# Patient Record
Sex: FEMALE | Race: ASIAN | NOT HISPANIC OR LATINO
[De-identification: names, ages, dates, MRNs, and addresses within clinical notes are randomized per-mention and may not be internally consistent; named-entity substitution may affect disease eponyms.]

---

## 2023-11-30 PROBLEM — Z00.00 ENCOUNTER FOR PREVENTIVE HEALTH EXAMINATION: Status: ACTIVE | Noted: 2023-11-30

## 2023-12-07 ENCOUNTER — APPOINTMENT (OUTPATIENT)
Dept: INFUSION THERAPY | Facility: CLINIC | Age: 33
End: 2023-12-07

## 2023-12-07 ENCOUNTER — APPOINTMENT (OUTPATIENT)
Dept: INFUSION THERAPY | Facility: CLINIC | Age: 33
End: 2023-12-07
Payer: COMMERCIAL

## 2023-12-07 ENCOUNTER — APPOINTMENT (OUTPATIENT)
Dept: HEMATOLOGY ONCOLOGY | Facility: CLINIC | Age: 33
End: 2023-12-07
Payer: COMMERCIAL

## 2023-12-07 ENCOUNTER — LABORATORY RESULT (OUTPATIENT)
Age: 33
End: 2023-12-07

## 2023-12-07 ENCOUNTER — OUTPATIENT (OUTPATIENT)
Dept: OUTPATIENT SERVICES | Facility: HOSPITAL | Age: 33
LOS: 1 days | Discharge: HOME | End: 2023-12-07
Payer: COMMERCIAL

## 2023-12-07 VITALS
SYSTOLIC BLOOD PRESSURE: 132 MMHG | BODY MASS INDEX: 34.02 KG/M2 | WEIGHT: 192 LBS | DIASTOLIC BLOOD PRESSURE: 86 MMHG | HEART RATE: 93 BPM | HEIGHT: 63 IN

## 2023-12-07 VITALS — TEMPERATURE: 97.2 F

## 2023-12-07 DIAGNOSIS — D56.3 THALASSEMIA MINOR: ICD-10-CM

## 2023-12-07 DIAGNOSIS — D64.9 ANEMIA, UNSPECIFIED: ICD-10-CM

## 2023-12-07 DIAGNOSIS — Z87.891 PERSONAL HISTORY OF NICOTINE DEPENDENCE: ICD-10-CM

## 2023-12-07 DIAGNOSIS — O99.012 ANEMIA COMPLICATING PREGNANCY, SECOND TRIMESTER: ICD-10-CM

## 2023-12-07 LAB
HCT VFR BLD CALC: 30.3 %
HGB BLD-MCNC: 9.8 G/DL
MCHC RBC-ENTMCNC: 18.8 PG
MCHC RBC-ENTMCNC: 32.3 G/DL
MCV RBC AUTO: 58 FL
PLATELET # BLD AUTO: 327 K/UL
PMV BLD: 9.3 FL
RBC # BLD: 5.22 M/UL
RBC # FLD: 17 %
WBC # FLD AUTO: 11.06 K/UL

## 2023-12-07 PROCEDURE — 36415 COLL VENOUS BLD VENIPUNCTURE: CPT

## 2023-12-07 PROCEDURE — 99204 OFFICE O/P NEW MOD 45 MIN: CPT

## 2023-12-07 PROCEDURE — 85027 COMPLETE CBC AUTOMATED: CPT

## 2023-12-07 PROCEDURE — 82728 ASSAY OF FERRITIN: CPT

## 2023-12-08 DIAGNOSIS — D64.9 ANEMIA, UNSPECIFIED: ICD-10-CM

## 2023-12-13 ENCOUNTER — APPOINTMENT (OUTPATIENT)
Dept: INFUSION THERAPY | Facility: CLINIC | Age: 33
End: 2023-12-13

## 2023-12-14 PROBLEM — D56.3 BETA THALASSEMIA TRAIT: Status: RESOLVED | Noted: 2023-12-14 | Resolved: 2023-12-14

## 2023-12-14 PROBLEM — Z87.891 FORMER SMOKER: Status: ACTIVE | Noted: 2023-12-07

## 2023-12-14 PROBLEM — O99.012 ANEMIA DURING PREGNANCY IN SECOND TRIMESTER: Status: ACTIVE | Noted: 2023-12-14

## 2023-12-14 LAB — FERRITIN SERPL-MCNC: 47 NG/ML

## 2023-12-14 NOTE — ASSESSMENT
[FreeTextEntry1] : Pt is a  33 year old at 27 weeks gestation presenting to the office with possible anemia by Dr. Banks. 2023 lab work shows Hgb 9, HCT 29.6%, MCV 61.3, RDW 18,  K, WBC 12.5. Pt states she has hx of anemia and recently dx with beta thalassemia trait during this pregnancy. She was taking PO iron prior and began again after 2023 labwork. Pt states she does feel fatigued and some days worse than others today she feels okay. Pt denies fever, chills, , chest pain, N/V/D, constipation, joint pain, any bruising or bleeding.   Impression:  Anemia in second trimester pregnancy                     h/o beta thalassemia trait  Plan: Records/pertinent labs reviewed. Blood work ordered:  CBC, iron studies, ferritin, Will call pt with results- discussed possibly needing IV iron. Follow up [id number], PCP.

## 2023-12-14 NOTE — HISTORY OF PRESENT ILLNESS
[de-identified] : Pt is a  33 year old at 27 weeks gestation presenting to the office with possible anemia by Dr. Banks. 2023 lab work shows Hgb 9, HCT 29.6%, MCV 61.3, RDW 18,  K, WBC 12.5. Pt states she has hx of anemia and recently dx with beta thalassemia trait during this pregnancy. She was taking PO iron prior and began again after 2023 labwork. Pt states she does feel fatigued and some days worse than others today she feels okay. Pt denies fever, chills, , chest pain, N/V/D, constipation, joint pain, any bruising or bleeding.

## 2023-12-29 ENCOUNTER — APPOINTMENT (OUTPATIENT)
Dept: INFUSION THERAPY | Facility: CLINIC | Age: 33
End: 2023-12-29

## 2023-12-29 ENCOUNTER — OUTPATIENT (OUTPATIENT)
Dept: OUTPATIENT SERVICES | Facility: HOSPITAL | Age: 33
LOS: 1 days | End: 2023-12-29
Payer: COMMERCIAL

## 2023-12-29 DIAGNOSIS — D64.9 ANEMIA, UNSPECIFIED: ICD-10-CM

## 2023-12-29 PROCEDURE — 96365 THER/PROPH/DIAG IV INF INIT: CPT

## 2023-12-29 RX ORDER — IRON SUCROSE 20 MG/ML
200 INJECTION, SOLUTION INTRAVENOUS ONCE
Refills: 0 | Status: COMPLETED | OUTPATIENT
Start: 2023-12-29 | End: 2023-12-29

## 2023-12-29 RX ADMIN — IRON SUCROSE 520 MILLIGRAM(S): 20 INJECTION, SOLUTION INTRAVENOUS at 15:39

## 2023-12-29 RX ADMIN — IRON SUCROSE 200 MILLIGRAM(S): 20 INJECTION, SOLUTION INTRAVENOUS at 16:11

## 2024-01-10 ENCOUNTER — APPOINTMENT (OUTPATIENT)
Dept: INFUSION THERAPY | Facility: CLINIC | Age: 34
End: 2024-01-10

## 2024-01-19 ENCOUNTER — APPOINTMENT (OUTPATIENT)
Dept: INFUSION THERAPY | Facility: CLINIC | Age: 34
End: 2024-01-19

## 2024-01-23 ENCOUNTER — LABORATORY RESULT (OUTPATIENT)
Age: 34
End: 2024-01-23

## 2024-01-23 ENCOUNTER — APPOINTMENT (OUTPATIENT)
Dept: HEMATOLOGY ONCOLOGY | Facility: CLINIC | Age: 34
End: 2024-01-23
Payer: COMMERCIAL

## 2024-01-23 ENCOUNTER — APPOINTMENT (OUTPATIENT)
Dept: INFUSION THERAPY | Facility: CLINIC | Age: 34
End: 2024-01-23
Payer: COMMERCIAL

## 2024-01-23 ENCOUNTER — OUTPATIENT (OUTPATIENT)
Dept: OUTPATIENT SERVICES | Facility: HOSPITAL | Age: 34
LOS: 1 days | End: 2024-01-23
Payer: COMMERCIAL

## 2024-01-23 VITALS
DIASTOLIC BLOOD PRESSURE: 79 MMHG | WEIGHT: 200 LBS | HEIGHT: 63 IN | SYSTOLIC BLOOD PRESSURE: 127 MMHG | TEMPERATURE: 97.9 F | HEART RATE: 95 BPM | BODY MASS INDEX: 35.44 KG/M2

## 2024-01-23 DIAGNOSIS — O99.013 ANEMIA COMPLICATING PREGNANCY, THIRD TRIMESTER: ICD-10-CM

## 2024-01-23 DIAGNOSIS — D64.9 ANEMIA, UNSPECIFIED: ICD-10-CM

## 2024-01-23 LAB
HCT VFR BLD CALC: 30.6 %
HGB BLD-MCNC: 10 G/DL
MCHC RBC-ENTMCNC: 19.2 PG
MCHC RBC-ENTMCNC: 32.7 G/DL
MCV RBC AUTO: 58.6 FL
PLATELET # BLD AUTO: 312 K/UL
PMV BLD: NORMAL
RBC # BLD: 5.22 M/UL
RBC # FLD: 17.2 %
WBC # FLD AUTO: 11.04 K/UL

## 2024-01-23 PROCEDURE — 83540 ASSAY OF IRON: CPT

## 2024-01-23 PROCEDURE — 99213 OFFICE O/P EST LOW 20 MIN: CPT | Mod: 25

## 2024-01-23 PROCEDURE — 85027 COMPLETE CBC AUTOMATED: CPT

## 2024-01-23 PROCEDURE — 36415 COLL VENOUS BLD VENIPUNCTURE: CPT

## 2024-01-23 PROCEDURE — 96365 THER/PROPH/DIAG IV INF INIT: CPT

## 2024-01-23 PROCEDURE — 83550 IRON BINDING TEST: CPT

## 2024-01-23 PROCEDURE — 82728 ASSAY OF FERRITIN: CPT

## 2024-01-23 PROCEDURE — 99213 OFFICE O/P EST LOW 20 MIN: CPT

## 2024-01-23 RX ORDER — IRON SUCROSE 20 MG/ML
200 INJECTION, SOLUTION INTRAVENOUS ONCE
Refills: 0 | Status: COMPLETED | OUTPATIENT
Start: 2024-01-23 | End: 2024-01-23

## 2024-01-23 RX ADMIN — IRON SUCROSE 200 MILLIGRAM(S): 20 INJECTION, SOLUTION INTRAVENOUS at 13:05

## 2024-01-23 RX ADMIN — IRON SUCROSE 220 MILLIGRAM(S): 20 INJECTION, SOLUTION INTRAVENOUS at 12:33

## 2024-01-24 DIAGNOSIS — D64.9 ANEMIA, UNSPECIFIED: ICD-10-CM

## 2024-01-24 LAB
FERRITIN SERPL-MCNC: 77 NG/ML
IRON SATN MFR SERPL: 17 %
IRON SERPL-MCNC: 78 UG/DL
TIBC SERPL-MCNC: 452 UG/DL
UIBC SERPL-MCNC: 374 UG/DL

## 2024-01-28 PROBLEM — O99.013 ANTEPARTUM ANEMIA IN THIRD TRIMESTER: Status: ACTIVE | Noted: 2024-01-28

## 2024-01-28 NOTE — ASSESSMENT
[FreeTextEntry1] : Pt is a  33 year old at 27 weeks gestation presenting to the office with possible anemia by Dr. Banks. 2023 lab work shows Hgb 9, HCT 29.6%, MCV 61.3, RDW 18,  K, WBC 12.5. Pt states she has hx of anemia and recently dx with beta thalassemia trait during this pregnancy. She was taking PO iron prior and began again after 2023 labwork. Pt states she does feel fatigued and some days worse than others today she feels okay. Pt denies fever, chills, , chest pain, N/V/D, constipation, joint pain, any bruising or bleeding.   Impression:  Anemia in third trimester pregnancy                     h/o beta thalassemia trait  Plan: Records/pertinent labs reviewed. Blood work ordered:  CBC, iron studies, ferritin,  Venofer #2 to be given today. Will call pt with results- discussed possibly needing IV iron. Follow up OBGYN, PCP.

## 2024-01-28 NOTE — REASON FOR VISIT
[Initial Consultation] : an initial consultation for [Follow-Up Visit] : a follow-up visit for [FreeTextEntry2] : Gestational anemia, FRANTZ

## 2024-01-28 NOTE — HISTORY OF PRESENT ILLNESS
[de-identified] : Pt is a  33 year old at 27 weeks gestation presenting to the office with possible anemia by Dr. Banks. 2023 lab work shows Hgb 9, HCT 29.6%, MCV 61.3, RDW 18,  K, WBC 12.5. Pt states she has hx of anemia and recently dx with beta thalassemia trait during this pregnancy. She was taking PO iron prior and began again after 2023 labwork. Pt states she does feel fatigued and some days worse than others today she feels okay. Pt denies fever, chills, , chest pain, N/V/D, constipation, joint pain, any bruising or bleeding.   [de-identified] :  2024 Pt is a  33 year old at 34 weeks gestation here for f/u anemia.  She only received one infusion of Venofer so far due to bad weather conditions. She felt that she improved after the first infusion. She has hx of anemia and recently dx with beta thalassemia trait during this pregnancy. She is taking PO iron daily.  Pt denies fever, chills, , chest pain, N/V/D, constipation, joint pain, any bruising or bleeding.

## 2024-01-28 NOTE — PHYSICAL EXAM
[Fully active, able to carry on all pre-disease performance without restriction] : Status 0 - Fully active, able to carry on all pre-disease performance without restriction [Normal] : affect appropriate [de-identified] : soft, distended abdomen at 34 weeks pregnant, no tenderness

## 2024-02-14 ENCOUNTER — APPOINTMENT (OUTPATIENT)
Dept: INFUSION THERAPY | Facility: CLINIC | Age: 34
End: 2024-02-14

## 2024-02-14 ENCOUNTER — OUTPATIENT (OUTPATIENT)
Dept: OUTPATIENT SERVICES | Facility: HOSPITAL | Age: 34
LOS: 1 days | End: 2024-02-14
Payer: COMMERCIAL

## 2024-02-14 VITALS — HEART RATE: 89 BPM | SYSTOLIC BLOOD PRESSURE: 116 MMHG | TEMPERATURE: 97 F | DIASTOLIC BLOOD PRESSURE: 76 MMHG

## 2024-02-14 DIAGNOSIS — D64.9 ANEMIA, UNSPECIFIED: ICD-10-CM

## 2024-02-14 PROCEDURE — 96365 THER/PROPH/DIAG IV INF INIT: CPT

## 2024-02-14 RX ORDER — IRON SUCROSE 20 MG/ML
200 INJECTION, SOLUTION INTRAVENOUS ONCE
Refills: 0 | Status: COMPLETED | OUTPATIENT
Start: 2024-02-14 | End: 2024-02-14

## 2024-02-14 RX ADMIN — IRON SUCROSE 220 MILLIGRAM(S): 20 INJECTION, SOLUTION INTRAVENOUS at 17:46

## 2024-02-15 DIAGNOSIS — D64.9 ANEMIA, UNSPECIFIED: ICD-10-CM

## 2024-02-28 ENCOUNTER — APPOINTMENT (OUTPATIENT)
Dept: INFUSION THERAPY | Facility: CLINIC | Age: 34
End: 2024-02-28

## 2024-02-28 ENCOUNTER — OUTPATIENT (OUTPATIENT)
Dept: OUTPATIENT SERVICES | Facility: HOSPITAL | Age: 34
LOS: 1 days | End: 2024-02-28
Payer: COMMERCIAL

## 2024-02-28 DIAGNOSIS — D64.9 ANEMIA, UNSPECIFIED: ICD-10-CM

## 2024-02-28 DIAGNOSIS — O99.012 ANEMIA COMPLICATING PREGNANCY, SECOND TRIMESTER: ICD-10-CM

## 2024-02-28 PROCEDURE — 96365 THER/PROPH/DIAG IV INF INIT: CPT

## 2024-02-28 RX ORDER — IRON SUCROSE 20 MG/ML
200 INJECTION, SOLUTION INTRAVENOUS ONCE
Refills: 0 | Status: COMPLETED | OUTPATIENT
Start: 2024-02-28 | End: 2024-02-28

## 2024-02-28 RX ADMIN — IRON SUCROSE 220 MILLIGRAM(S): 20 INJECTION, SOLUTION INTRAVENOUS at 16:08

## 2024-02-29 DIAGNOSIS — O99.012 ANEMIA COMPLICATING PREGNANCY, SECOND TRIMESTER: ICD-10-CM

## 2024-03-03 ENCOUNTER — INPATIENT (INPATIENT)
Facility: HOSPITAL | Age: 34
LOS: 3 days | Discharge: HOME | End: 2024-03-07
Attending: STUDENT IN AN ORGANIZED HEALTH CARE EDUCATION/TRAINING PROGRAM | Admitting: STUDENT IN AN ORGANIZED HEALTH CARE EDUCATION/TRAINING PROGRAM
Payer: COMMERCIAL

## 2024-03-03 VITALS — HEART RATE: 88 BPM | SYSTOLIC BLOOD PRESSURE: 127 MMHG | DIASTOLIC BLOOD PRESSURE: 73 MMHG

## 2024-03-03 DIAGNOSIS — O42.92 FULL-TERM PREMATURE RUPTURE OF MEMBRANES, UNSPECIFIED AS TO LENGTH OF TIME BETWEEN RUPTURE AND ONSET OF LABOR: ICD-10-CM

## 2024-03-03 LAB
ABO RH CONFIRMATION: SIGNIFICANT CHANGE UP
APPEARANCE UR: ABNORMAL
BACTERIA # UR AUTO: ABNORMAL /HPF
BASOPHILS # BLD AUTO: 0.04 K/UL — SIGNIFICANT CHANGE UP (ref 0–0.2)
BASOPHILS NFR BLD AUTO: 0.4 % — SIGNIFICANT CHANGE UP (ref 0–1)
BILIRUB UR-MCNC: NEGATIVE — SIGNIFICANT CHANGE UP
BLD GP AB SCN SERPL QL: SIGNIFICANT CHANGE UP
CAST: 6 /LPF — HIGH (ref 0–4)
COLOR SPEC: YELLOW — SIGNIFICANT CHANGE UP
DIFF PNL FLD: NEGATIVE — SIGNIFICANT CHANGE UP
EOSINOPHIL # BLD AUTO: 0.03 K/UL — SIGNIFICANT CHANGE UP (ref 0–0.7)
EOSINOPHIL NFR BLD AUTO: 0.3 % — SIGNIFICANT CHANGE UP (ref 0–8)
GLUCOSE UR QL: NEGATIVE MG/DL — SIGNIFICANT CHANGE UP
HCT VFR BLD CALC: 32.2 % — LOW (ref 37–47)
HGB BLD-MCNC: 10.2 G/DL — LOW (ref 12–16)
HIV 1 & 2 AB SERPL IA.RAPID: SIGNIFICANT CHANGE UP
IMM GRANULOCYTES NFR BLD AUTO: 0.6 % — HIGH (ref 0.1–0.3)
KETONES UR-MCNC: NEGATIVE MG/DL — SIGNIFICANT CHANGE UP
LEUKOCYTE ESTERASE UR-ACNC: ABNORMAL
LYMPHOCYTES # BLD AUTO: 2.3 K/UL — SIGNIFICANT CHANGE UP (ref 1.2–3.4)
LYMPHOCYTES # BLD AUTO: 20.6 % — SIGNIFICANT CHANGE UP (ref 20.5–51.1)
MCHC RBC-ENTMCNC: 18.9 PG — LOW (ref 27–31)
MCHC RBC-ENTMCNC: 31.7 G/DL — LOW (ref 32–37)
MCV RBC AUTO: 59.6 FL — LOW (ref 81–99)
MONOCYTES # BLD AUTO: 0.72 K/UL — HIGH (ref 0.1–0.6)
MONOCYTES NFR BLD AUTO: 6.5 % — SIGNIFICANT CHANGE UP (ref 1.7–9.3)
NEUTROPHILS # BLD AUTO: 7.99 K/UL — HIGH (ref 1.4–6.5)
NEUTROPHILS NFR BLD AUTO: 71.6 % — SIGNIFICANT CHANGE UP (ref 42.2–75.2)
NITRITE UR-MCNC: NEGATIVE — SIGNIFICANT CHANGE UP
NRBC # BLD: 0 /100 WBCS — SIGNIFICANT CHANGE UP (ref 0–0)
PH UR: 6.5 — SIGNIFICANT CHANGE UP (ref 5–8)
PLATELET # BLD AUTO: 247 K/UL — SIGNIFICANT CHANGE UP (ref 130–400)
PMV BLD: SIGNIFICANT CHANGE UP (ref 7.4–10.4)
PRENATAL SYPHILIS TEST: SIGNIFICANT CHANGE UP
PROT UR-MCNC: SIGNIFICANT CHANGE UP MG/DL
RBC # BLD: 5.4 M/UL — SIGNIFICANT CHANGE UP (ref 4.2–5.4)
RBC # FLD: 17.4 % — HIGH (ref 11.5–14.5)
RBC CASTS # UR COMP ASSIST: 2 /HPF — SIGNIFICANT CHANGE UP (ref 0–4)
SP GR SPEC: 1.02 — SIGNIFICANT CHANGE UP (ref 1–1.03)
SQUAMOUS # UR AUTO: >36 /HPF — HIGH (ref 0–5)
UROBILINOGEN FLD QL: 1 MG/DL — SIGNIFICANT CHANGE UP (ref 0.2–1)
WBC # BLD: 11.15 K/UL — HIGH (ref 4.8–10.8)
WBC # FLD AUTO: 11.15 K/UL — HIGH (ref 4.8–10.8)
WBC UR QL: 4 /HPF — SIGNIFICANT CHANGE UP (ref 0–5)

## 2024-03-03 PROCEDURE — 86703 HIV-1/HIV-2 1 RESULT ANTBDY: CPT

## 2024-03-03 PROCEDURE — 80307 DRUG TEST PRSMV CHEM ANLYZR: CPT

## 2024-03-03 PROCEDURE — 86850 RBC ANTIBODY SCREEN: CPT

## 2024-03-03 PROCEDURE — 36415 COLL VENOUS BLD VENIPUNCTURE: CPT

## 2024-03-03 PROCEDURE — 59050 FETAL MONITOR W/REPORT: CPT

## 2024-03-03 PROCEDURE — 85730 THROMBOPLASTIN TIME PARTIAL: CPT

## 2024-03-03 PROCEDURE — 36430 TRANSFUSION BLD/BLD COMPNT: CPT

## 2024-03-03 PROCEDURE — 86923 COMPATIBILITY TEST ELECTRIC: CPT

## 2024-03-03 PROCEDURE — 86592 SYPHILIS TEST NON-TREP QUAL: CPT

## 2024-03-03 PROCEDURE — 80354 DRUG SCREENING FENTANYL: CPT

## 2024-03-03 PROCEDURE — P9016: CPT

## 2024-03-03 PROCEDURE — 86900 BLOOD TYPING SEROLOGIC ABO: CPT

## 2024-03-03 PROCEDURE — 85384 FIBRINOGEN ACTIVITY: CPT

## 2024-03-03 PROCEDURE — 81001 URINALYSIS AUTO W/SCOPE: CPT

## 2024-03-03 PROCEDURE — 85610 PROTHROMBIN TIME: CPT

## 2024-03-03 PROCEDURE — 86901 BLOOD TYPING SEROLOGIC RH(D): CPT

## 2024-03-03 PROCEDURE — 85025 COMPLETE CBC W/AUTO DIFF WBC: CPT

## 2024-03-03 RX ORDER — OXYTOCIN 10 UNIT/ML
333.33 VIAL (ML) INJECTION
Qty: 20 | Refills: 0 | Status: DISCONTINUED | OUTPATIENT
Start: 2024-03-03 | End: 2024-03-07

## 2024-03-03 RX ORDER — DINOPROSTONE 10 MG/241MG
10 INSERT VAGINAL ONCE
Refills: 0 | Status: COMPLETED | OUTPATIENT
Start: 2024-03-03 | End: 2024-03-03

## 2024-03-03 RX ORDER — SODIUM CHLORIDE 9 MG/ML
1000 INJECTION, SOLUTION INTRAVENOUS
Refills: 0 | Status: DISCONTINUED | OUTPATIENT
Start: 2024-03-03 | End: 2024-03-04

## 2024-03-03 RX ADMIN — DINOPROSTONE 10 MILLIGRAM(S): 10 INSERT VAGINAL at 20:20

## 2024-03-03 NOTE — OB PROVIDER H&P - HISTORY OF PRESENT ILLNESS
34yo  at 40w0d GA (RADHAMES: 3/3/24, by LMP) presents to labor and delivery for scheduled elective IOL. Endorses irregular contractions. Denies vaginal bleeding, leakage of fluids. Endorses good fetal movement. GBS neg.    Pregnancy complicated by:  - h/o beta thal trait, FOB neg  - fetal renal pelvis dilation  - elevated GCT, normal GTT 34yo  at 40w0d GA (RADAHMES: 3/3/24, by LMP) presents to labor and delivery for scheduled IOL. Endorses irregular contractions. Denies vaginal bleeding, leakage of fluids. Endorses good fetal movement. GBS neg.    Pregnancy complicated by:  - h/o beta thal trait, FOB neg  - fetal renal pelvis dilation  - elevated GCT, normal GTT

## 2024-03-03 NOTE — OB PROVIDER H&P - NSHPLABSRESULTS_GEN_ALL_CORE
2/8/24  GBS neg    12/1/23  GTT 80/141/151/118    11/22/23      7/24/23  A pos, neg antibody screen  RPR NR  HepBSAg NR  HIV NR  varicella immune  rubella immune

## 2024-03-03 NOTE — OB PROVIDER H&P - NSLOWPPHRISK_OBGYN_A_OB
No previous uterine incision/Benavides Pregnancy/Less than or equal to 4 previous vaginal births/No known bleeding disorder/No history of postpartum hemorrhage/No other PPH risks indicated

## 2024-03-03 NOTE — OB PROVIDER H&P - ASSESSMENT
A/P: 34yo  at 40w0d GA, GBS neg, elective IOL at term    -admit to labor and delivery  -pain management prn   -continous efm & toco  -admission labs  -IV access   -IV hydration   -diet: clear liquid diet     Case discussed with Dr. Tapia and Dr. Banks

## 2024-03-03 NOTE — OB PROVIDER H&P - ATTENDING COMMENTS
Alicia Garsia is a  at 40w0d by LMP c/w 8wk US admitted for IOL for fetus with R hydronephrosis (as recommended by [facility] Dr. Valdovinos)    - Fetus with R hydronephrosis with calyceal and ureter dilation (R renal pelvis 30.3mm), ?multicystic appearance on scan . Peds notified.   - Admit SVE C/L/H, cervidil for cervical ripening.  - Beta thalassemia trait (FOB negative), iron deficiency anemia s/p iron infusions. Admission labs reviewed H/H 10.2/32.2, plts 247K  - FHR category 1, reactive and reassuring  - Last Growth US on 24 at 39w1d, EFW 3446g (55%), AC 34%, VTX, posterior placenta, PIYUSH 21.1cm, BPP 8/8, normal UAD  - GBS neg      Reviewed with patient R/B/A/I of IOL as well as process and expectations. All questions answered to her satisfaction.      Jo Banks MD

## 2024-03-03 NOTE — OB RN PATIENT PROFILE - WEIGHT IN KG
Azathioprine Counseling:  I discussed with the patient the risks of azathioprine including but not limited to myelosuppression, immunosuppression, hepatotoxicity, lymphoma, and infections.  The patient understands that monitoring is required including baseline LFTs, Creatinine, possible TPMP genotyping and weekly CBCs for the first month and then every 2 weeks thereafter.  The patient verbalized understanding of the proper use and possible adverse effects of azathioprine.  All of the patient's questions and concerns were addressed. 94.8

## 2024-03-03 NOTE — OB PROVIDER H&P - NSHPPHYSICALEXAM_GEN_ALL_CORE
Vital Signs Last 24 Hrs  HR: 88 (03 Mar 2024 17:26) (88 - 88)  BP: 127/73 (03 Mar 2024 17:26) (127/73 - 127/73)  RR: 20 (03 Mar 2024 17:26) (20 - 20)    Physical Exam  Gen: AAOx3, NAD  Abd: soft, gravid, nontender, no palpable contractions  Ext: no edema or erythema in bilateral upper and lower extremities  SVE: 0/0/-3    EFM: 140/mod/+accels  [place]: irreg  BSUS: vertex

## 2024-03-04 ENCOUNTER — APPOINTMENT (OUTPATIENT)
Dept: INFUSION THERAPY | Facility: CLINIC | Age: 34
End: 2024-03-04

## 2024-03-04 LAB
AMPHET UR-MCNC: NEGATIVE — SIGNIFICANT CHANGE UP
APTT BLD: 27.2 SEC — SIGNIFICANT CHANGE UP (ref 27–39.2)
BARBITURATES UR SCN-MCNC: NEGATIVE — SIGNIFICANT CHANGE UP
BASOPHILS # BLD AUTO: 0.03 K/UL — SIGNIFICANT CHANGE UP (ref 0–0.2)
BASOPHILS NFR BLD AUTO: 0.1 % — SIGNIFICANT CHANGE UP (ref 0–1)
BENZODIAZ UR-MCNC: NEGATIVE — SIGNIFICANT CHANGE UP
BUPRENORPHINE SCREEN, URINE RESULT: NEGATIVE — SIGNIFICANT CHANGE UP
COCAINE METAB.OTHER UR-MCNC: NEGATIVE — SIGNIFICANT CHANGE UP
EOSINOPHIL # BLD AUTO: 0 K/UL — SIGNIFICANT CHANGE UP (ref 0–0.7)
EOSINOPHIL NFR BLD AUTO: 0 % — SIGNIFICANT CHANGE UP (ref 0–8)
FENTANYL UR QL: NEGATIVE — SIGNIFICANT CHANGE UP
FIBRINOGEN PPP-MCNC: 550 MG/DL — HIGH (ref 200–435)
HCT VFR BLD CALC: 35.1 % — LOW (ref 37–47)
HGB BLD-MCNC: 10.9 G/DL — LOW (ref 12–16)
IMM GRANULOCYTES NFR BLD AUTO: 0.6 % — HIGH (ref 0.1–0.3)
INR BLD: 0.91 RATIO — SIGNIFICANT CHANGE UP (ref 0.65–1.3)
L&D DRUG SCREEN, URINE: SIGNIFICANT CHANGE UP
LYMPHOCYTES # BLD AUTO: 0.83 K/UL — LOW (ref 1.2–3.4)
LYMPHOCYTES # BLD AUTO: 3.8 % — LOW (ref 20.5–51.1)
MCHC RBC-ENTMCNC: 18.8 PG — LOW (ref 27–31)
MCHC RBC-ENTMCNC: 31.1 G/DL — LOW (ref 32–37)
MCV RBC AUTO: 60.5 FL — LOW (ref 81–99)
METHADONE UR-MCNC: NEGATIVE — SIGNIFICANT CHANGE UP
MONOCYTES # BLD AUTO: 0.94 K/UL — HIGH (ref 0.1–0.6)
MONOCYTES NFR BLD AUTO: 4.3 % — SIGNIFICANT CHANGE UP (ref 1.7–9.3)
NEUTROPHILS # BLD AUTO: 19.82 K/UL — HIGH (ref 1.4–6.5)
NEUTROPHILS NFR BLD AUTO: 91.2 % — HIGH (ref 42.2–75.2)
NRBC # BLD: 0 /100 WBCS — SIGNIFICANT CHANGE UP (ref 0–0)
OPIATES UR-MCNC: NEGATIVE — SIGNIFICANT CHANGE UP
OXYCODONE UR-MCNC: NEGATIVE — SIGNIFICANT CHANGE UP
PCP UR-MCNC: NEGATIVE — SIGNIFICANT CHANGE UP
PLATELET # BLD AUTO: 237 K/UL — SIGNIFICANT CHANGE UP (ref 130–400)
PMV BLD: SIGNIFICANT CHANGE UP (ref 7.4–10.4)
PROPOXYPHENE QUALITATIVE URINE RESULT: NEGATIVE — SIGNIFICANT CHANGE UP
PROTHROM AB SERPL-ACNC: 10.4 SEC — SIGNIFICANT CHANGE UP (ref 9.95–12.87)
RBC # BLD: 5.8 M/UL — HIGH (ref 4.2–5.4)
RBC # FLD: 17.8 % — HIGH (ref 11.5–14.5)
WBC # BLD: 21.75 K/UL — HIGH (ref 4.8–10.8)
WBC # FLD AUTO: 21.75 K/UL — HIGH (ref 4.8–10.8)

## 2024-03-04 RX ORDER — SIMETHICONE 80 MG/1
80 TABLET, CHEWABLE ORAL EVERY 4 HOURS
Refills: 0 | Status: DISCONTINUED | OUTPATIENT
Start: 2024-03-04 | End: 2024-03-07

## 2024-03-04 RX ORDER — DEXAMETHASONE 0.5 MG/5ML
4 ELIXIR ORAL EVERY 6 HOURS
Refills: 0 | Status: DISCONTINUED | OUTPATIENT
Start: 2024-03-04 | End: 2024-03-04

## 2024-03-04 RX ORDER — ONDANSETRON 8 MG/1
4 TABLET, FILM COATED ORAL EVERY 6 HOURS
Refills: 0 | Status: DISCONTINUED | OUTPATIENT
Start: 2024-03-04 | End: 2024-03-04

## 2024-03-04 RX ORDER — BENZOCAINE 10 %
1 GEL (GRAM) MUCOUS MEMBRANE EVERY 6 HOURS
Refills: 0 | Status: DISCONTINUED | OUTPATIENT
Start: 2024-03-04 | End: 2024-03-07

## 2024-03-04 RX ORDER — SODIUM CHLORIDE 9 MG/ML
3 INJECTION INTRAMUSCULAR; INTRAVENOUS; SUBCUTANEOUS EVERY 8 HOURS
Refills: 0 | Status: DISCONTINUED | OUTPATIENT
Start: 2024-03-04 | End: 2024-03-07

## 2024-03-04 RX ORDER — OXYCODONE HYDROCHLORIDE 5 MG/1
5 TABLET ORAL
Refills: 0 | Status: DISCONTINUED | OUTPATIENT
Start: 2024-03-04 | End: 2024-03-07

## 2024-03-04 RX ORDER — ACETAMINOPHEN 500 MG
975 TABLET ORAL
Refills: 0 | Status: DISCONTINUED | OUTPATIENT
Start: 2024-03-04 | End: 2024-03-07

## 2024-03-04 RX ORDER — IBUPROFEN 200 MG
600 TABLET ORAL EVERY 6 HOURS
Refills: 0 | Status: COMPLETED | OUTPATIENT
Start: 2024-03-04 | End: 2025-01-31

## 2024-03-04 RX ORDER — OXYTOCIN 10 UNIT/ML
2 VIAL (ML) INJECTION
Qty: 30 | Refills: 0 | Status: DISCONTINUED | OUTPATIENT
Start: 2024-03-04 | End: 2024-03-04

## 2024-03-04 RX ORDER — KETOROLAC TROMETHAMINE 30 MG/ML
30 SYRINGE (ML) INJECTION ONCE
Refills: 0 | Status: DISCONTINUED | OUTPATIENT
Start: 2024-03-04 | End: 2024-03-04

## 2024-03-04 RX ORDER — AER TRAVELER 0.5 G/1
1 SOLUTION RECTAL; TOPICAL EVERY 4 HOURS
Refills: 0 | Status: DISCONTINUED | OUTPATIENT
Start: 2024-03-04 | End: 2024-03-07

## 2024-03-04 RX ORDER — DIBUCAINE 1 %
1 OINTMENT (GRAM) RECTAL EVERY 6 HOURS
Refills: 0 | Status: DISCONTINUED | OUTPATIENT
Start: 2024-03-04 | End: 2024-03-07

## 2024-03-04 RX ORDER — TETANUS TOXOID, REDUCED DIPHTHERIA TOXOID AND ACELLULAR PERTUSSIS VACCINE, ADSORBED 5; 2.5; 8; 8; 2.5 [IU]/.5ML; [IU]/.5ML; UG/.5ML; UG/.5ML; UG/.5ML
0.5 SUSPENSION INTRAMUSCULAR ONCE
Refills: 0 | Status: DISCONTINUED | OUTPATIENT
Start: 2024-03-04 | End: 2024-03-07

## 2024-03-04 RX ORDER — FENTANYL/BUPIVACAINE/NS/PF 2MCG/ML-.1
250 PLASTIC BAG, INJECTION (ML) INJECTION
Refills: 0 | Status: DISCONTINUED | OUTPATIENT
Start: 2024-03-04 | End: 2024-03-04

## 2024-03-04 RX ORDER — LANOLIN
1 OINTMENT (GRAM) TOPICAL EVERY 6 HOURS
Refills: 0 | Status: DISCONTINUED | OUTPATIENT
Start: 2024-03-04 | End: 2024-03-07

## 2024-03-04 RX ORDER — OXYCODONE HYDROCHLORIDE 5 MG/1
5 TABLET ORAL ONCE
Refills: 0 | Status: DISCONTINUED | OUTPATIENT
Start: 2024-03-04 | End: 2024-03-07

## 2024-03-04 RX ORDER — IBUPROFEN 200 MG
600 TABLET ORAL EVERY 6 HOURS
Refills: 0 | Status: DISCONTINUED | OUTPATIENT
Start: 2024-03-04 | End: 2024-03-07

## 2024-03-04 RX ORDER — NALOXONE HYDROCHLORIDE 4 MG/.1ML
0.1 SPRAY NASAL
Refills: 0 | Status: DISCONTINUED | OUTPATIENT
Start: 2024-03-04 | End: 2024-03-04

## 2024-03-04 RX ORDER — DIPHENHYDRAMINE HCL 50 MG
25 CAPSULE ORAL EVERY 6 HOURS
Refills: 0 | Status: DISCONTINUED | OUTPATIENT
Start: 2024-03-04 | End: 2024-03-07

## 2024-03-04 RX ORDER — MAGNESIUM HYDROXIDE 400 MG/1
30 TABLET, CHEWABLE ORAL
Refills: 0 | Status: DISCONTINUED | OUTPATIENT
Start: 2024-03-04 | End: 2024-03-07

## 2024-03-04 RX ADMIN — SODIUM CHLORIDE 3 MILLILITER(S): 9 INJECTION INTRAMUSCULAR; INTRAVENOUS; SUBCUTANEOUS at 15:20

## 2024-03-04 RX ADMIN — Medication 975 MILLIGRAM(S): at 20:57

## 2024-03-04 RX ADMIN — Medication 0.2 MILLIGRAM(S): at 20:57

## 2024-03-04 RX ADMIN — Medication 30 MILLIGRAM(S): at 16:48

## 2024-03-04 RX ADMIN — Medication 2 MILLIUNIT(S)/MIN: at 04:30

## 2024-03-04 RX ADMIN — MAGNESIUM HYDROXIDE 30 MILLILITER(S): 400 TABLET, CHEWABLE ORAL at 20:57

## 2024-03-04 RX ADMIN — Medication 975 MILLIGRAM(S): at 22:40

## 2024-03-04 RX ADMIN — Medication 0.2 MILLIGRAM(S): at 13:46

## 2024-03-04 RX ADMIN — Medication 0.2 MILLIGRAM(S): at 14:16

## 2024-03-04 NOTE — OB PROVIDER DELIVERY SUMMARY - NSPROVIDERDELIVERYNOTE_OBGYN_ALL_OB_FT
Patient was fully dilated and pushing. Fetal head was OA and restituted to ROT. The anterior and posterior shoulders delivered, followed by the remaining body atraumatically. Delayed cord clamping was performed, and then clamped and cut. Cord blood gases collected x2. The  was handed to the mother and RN. The placenta delivered intact with membranes. Pitocin was administered. Uterus massaged, fundus found to be firm. Cervix, vagina and perineum inspected second degree laceration was noted, repaired using 2-0 chromic, bilateral labial lacerations repaired using 3-0 chromic in the usual fashion with good hemostasis. IM methergine x2 administered. 1inch vaginal packing with kerlex.     Viable male infant delivered, weighing 3460 grams with APGARs 9/9.     QBL 861cc    Dr. Banks present for the delivery. Patient was fully dilated and pushing. Fetal head was OA and restituted to ROT. The anterior and posterior shoulders delivered, followed by the remaining body atraumatically. Delayed cord clamping was performed, and then clamped and cut. Cord blood gases collected x2. The  was handed to the mother and RN. The placenta delivered intact with membranes. Pitocin was administered. Uterus massaged, fundus found to be firm. Cervix, vagina and perineum inspected second degree laceration was noted, repaired using 2-0 chromic, bilateral labial lacerations repaired using 3-0 chromic in the usual fashion with good hemostasis. IM methergine x2 administered. 1inch vaginal packing with kerlex.     Viable male infant delivered, weighing 3460 grams with APGARs 9/9.     QBL 900cc    Dr. Banks present for the delivery. Patient was fully dilated and pushing. Fetal head was OA and restituted to ROT. Compound hand presentation was noted. No nuchal cord. The anterior and posterior shoulders delivered, followed by the remaining body atraumatically. Delayed cord clamping was performed, and then clamped and cut. Cord blood gases collected x2. The  was handed to the mother and RN. The placenta delivered intact with membranes. Pitocin was administered. Uterus massaged, fundus found to be firm, GISELLE with intermittent atony. Cervix, vagina and perineum inspected stellate second degree laceration was noted, repaired using 2-0 chromic, bilateral labial lacerations repaired using 3-0 chromic in the usual fashion with good hemostasis. IM methergine x2 administered for intermittent GISELLE atony with resultant excellent tone. 1inch vaginal packing with kerlex.     Viable male infant delivered, weighing 3460 grams with APGARs 9/9.     [facility] 900cc    Dr. Banks present for the delivery.

## 2024-03-04 NOTE — OB PROVIDER DELIVERY SUMMARY - NS_GESTAGEATBIRTHA_OBGYN_ALL_OB_FT
RETURN TO WORK    3/30/2020      Re: Claudia Martinez        YOB: 1969         [address] [facility] [id number]                      This is to certify that Soha has been under my care from 3/30/2020 and is excused from work on 3/30/2020 and until fever free without medication for 72 hours, or 7 days from cough stopping.     RESTRICTIONS: n/a      REMARKS: n/a        SIGNATURE:___________________________________________      Dr. Cadence Gonzalez          [address] [facility]  [phone]
40w1d

## 2024-03-04 NOTE — OB PROVIDER LABOR PROGRESS NOTE - NS_SUBJECTIVE/OBJECTIVE_OBGYN_ALL_OB_FT
Patient examined for evaluation of labor progress. Pitocin IOL currently infusing at 6 mu/min. Pt reporting pain with contractions and requests epidural.

## 2024-03-04 NOTE — PROGRESS NOTE ADULT - ASSESSMENT
A/P: Alicia Garsia is a 34yo  PPD0 S/P , delivery c/b intermittent GISELLE atony and stellate 2nd degree laceration, s/p Methergine IM x 2, vaginal packing/gregory, QBL 900mL  - Immediate postpartum CBC stable. Coags wnl. Repeat CBC in AM  - Vaginal packing/gregory to remain until AM  - PO methergine series x 24H  - encourage ambulation, PO hydration  - monitor vitals, bleeding

## 2024-03-04 NOTE — OB PROVIDER LABOR PROGRESS NOTE - ASSESSMENT
A/P: 34yo  at 40w0d GA, GBS neg, IOL at term    Plan:  Continue EFM/TOCO monitoring  Continue IV hydration  Continue Clear Liquid diet  Continue Pitocin per protocol  Anesthesia team notified for epidural    Dr. Banks aware

## 2024-03-04 NOTE — PROGRESS NOTE ADULT - ASSESSMENT
A/P:   33y  at 40w1d, GBS positive, s/p epidural, on pitocin, s/p cervidil, in labor progressing well.  -Continue current management, pitocin at 12mu/min   -Pain management prn, s/p epidural   -Continuous EFM/toco  -IV fluid hydration, CLD  -Reevaluate

## 2024-03-04 NOTE — PROCEDURE NOTE - ADDITIONAL PROCEDURE DETAILS
Patient tolerated procedure well. Hemodynamically stable, degree of motor block appropriate for epidural medication administered. Patient is aware that the anesthesia team is available 24/7, in case she needs more pain medication or has any other anesthesia-related needs or questions.   .0625% Bupivacaine +2ucg/cc Fentanyl epidural PCEA infusion started Patient tolerated procedure well. Hemodynamically stable, degree of motor block appropriate for epidural medication administered. Patient is aware that the anesthesia team is available 24/7, in case she needs more pain medication or has any other anesthesia-related needs or questions.   .0625% Bupivacaine +2ucg/cc Fentanyl epidural PCEA infusion started    1355 topup for repair, lido 2% 5cc Patient tolerated procedure well. Hemodynamically stable, degree of motor block appropriate for epidural medication administered. Patient is aware that the anesthesia team is available 24/7, in case she needs more pain medication or has any other anesthesia-related needs or questions.   .0625% Bupivacaine +2ucg/cc Fentanyl epidural PCEA infusion started    1147 Top-off for pain.   Patient evaluated at bedside. Hemodynamically stable, degree of motor block appropriate for epidural medication administered. Patient is aware that the anesthesia team is available 24/7, in case she needs more pain medication or has any other anesthesia-related needs or questions.   .0625% Bupivacaine +2ucg/cc Fentanyl epidural PCEA infusion   Top Off 10 ml 0.125% Bupivacaine given in small increments after negative aspiration, VSS, FHR wnl.    1355 topup for repair, lido 2% 5cc    1430 Post Labor  Epidural/ Delivery  Evaluation Note:    Uncomplicated anesthetic for Vaginal Delivery.    Patient seen at bedside. Epidural to be removed by RN before patient transfer.  Patient moving B/L lower extremities.  Motor block appropriate and resolving. Vital Signs are stable. Pain well controlled.     Gia Score greater than 9    Mental Status:  __x__ Awake   ___x__ Alert   _____ Drowsy   _____ Sedated    Nausea/Vomiting:  __x__ NO  ______Yes,   See Post - Op Orders          Pain Scale (0-10):  _____    Treatment: __X__ None       Plan: Discharge:   ____Home       __X___Floor     _____Critical Care    _____

## 2024-03-04 NOTE — OB RN DELIVERY SUMMARY - NS_VACUUMATTEMPT_OBGYN_ALL_OB
Patient refused participation in discharge caring contact program.    Vacuum Extraction was not used

## 2024-03-05 LAB
BASOPHILS # BLD AUTO: 0.04 K/UL — SIGNIFICANT CHANGE UP (ref 0–0.2)
BASOPHILS # BLD AUTO: 0.05 K/UL — SIGNIFICANT CHANGE UP (ref 0–0.2)
BASOPHILS NFR BLD AUTO: 0.2 % — SIGNIFICANT CHANGE UP (ref 0–1)
BASOPHILS NFR BLD AUTO: 0.2 % — SIGNIFICANT CHANGE UP (ref 0–1)
EOSINOPHIL # BLD AUTO: 0.01 K/UL — SIGNIFICANT CHANGE UP (ref 0–0.7)
EOSINOPHIL # BLD AUTO: 0.05 K/UL — SIGNIFICANT CHANGE UP (ref 0–0.7)
EOSINOPHIL NFR BLD AUTO: 0 % — SIGNIFICANT CHANGE UP (ref 0–8)
EOSINOPHIL NFR BLD AUTO: 0.2 % — SIGNIFICANT CHANGE UP (ref 0–8)
HCT VFR BLD CALC: 25.5 % — LOW (ref 37–47)
HCT VFR BLD CALC: 27.1 % — LOW (ref 37–47)
HGB BLD-MCNC: 8.2 G/DL — LOW (ref 12–16)
HGB BLD-MCNC: 8.7 G/DL — LOW (ref 12–16)
IMM GRANULOCYTES NFR BLD AUTO: 0.6 % — HIGH (ref 0.1–0.3)
IMM GRANULOCYTES NFR BLD AUTO: 1.2 % — HIGH (ref 0.1–0.3)
LYMPHOCYTES # BLD AUTO: 1.8 K/UL — SIGNIFICANT CHANGE UP (ref 1.2–3.4)
LYMPHOCYTES # BLD AUTO: 2.06 K/UL — SIGNIFICANT CHANGE UP (ref 1.2–3.4)
LYMPHOCYTES # BLD AUTO: 7.2 % — LOW (ref 20.5–51.1)
LYMPHOCYTES # BLD AUTO: 7.9 % — LOW (ref 20.5–51.1)
MCHC RBC-ENTMCNC: 19.4 PG — LOW (ref 27–31)
MCHC RBC-ENTMCNC: 19.4 PG — LOW (ref 27–31)
MCHC RBC-ENTMCNC: 32.1 G/DL — SIGNIFICANT CHANGE UP (ref 32–37)
MCHC RBC-ENTMCNC: 32.2 G/DL — SIGNIFICANT CHANGE UP (ref 32–37)
MCV RBC AUTO: 60.4 FL — LOW (ref 81–99)
MCV RBC AUTO: 60.5 FL — LOW (ref 81–99)
MONOCYTES # BLD AUTO: 1.33 K/UL — HIGH (ref 0.1–0.6)
MONOCYTES # BLD AUTO: 1.33 K/UL — HIGH (ref 0.1–0.6)
MONOCYTES NFR BLD AUTO: 5.1 % — SIGNIFICANT CHANGE UP (ref 1.7–9.3)
MONOCYTES NFR BLD AUTO: 5.3 % — SIGNIFICANT CHANGE UP (ref 1.7–9.3)
NEUTROPHILS # BLD AUTO: 21.61 K/UL — HIGH (ref 1.4–6.5)
NEUTROPHILS # BLD AUTO: 22.17 K/UL — HIGH (ref 1.4–6.5)
NEUTROPHILS NFR BLD AUTO: 85.4 % — HIGH (ref 42.2–75.2)
NEUTROPHILS NFR BLD AUTO: 86.7 % — HIGH (ref 42.2–75.2)
NRBC # BLD: 0 /100 WBCS — SIGNIFICANT CHANGE UP (ref 0–0)
NRBC # BLD: 0 /100 WBCS — SIGNIFICANT CHANGE UP (ref 0–0)
PLATELET # BLD AUTO: 205 K/UL — SIGNIFICANT CHANGE UP (ref 130–400)
PLATELET # BLD AUTO: 219 K/UL — SIGNIFICANT CHANGE UP (ref 130–400)
PMV BLD: SIGNIFICANT CHANGE UP (ref 7.4–10.4)
PMV BLD: SIGNIFICANT CHANGE UP (ref 7.4–10.4)
RBC # BLD: 4.22 M/UL — SIGNIFICANT CHANGE UP (ref 4.2–5.4)
RBC # BLD: 4.48 M/UL — SIGNIFICANT CHANGE UP (ref 4.2–5.4)
RBC # FLD: 16.5 % — HIGH (ref 11.5–14.5)
RBC # FLD: 16.5 % — HIGH (ref 11.5–14.5)
WBC # BLD: 24.93 K/UL — HIGH (ref 4.8–10.8)
WBC # BLD: 25.96 K/UL — HIGH (ref 4.8–10.8)
WBC # FLD AUTO: 24.93 K/UL — HIGH (ref 4.8–10.8)
WBC # FLD AUTO: 25.96 K/UL — HIGH (ref 4.8–10.8)

## 2024-03-05 RX ORDER — AMPICILLIN TRIHYDRATE 250 MG
2 CAPSULE ORAL EVERY 6 HOURS
Refills: 0 | Status: DISCONTINUED | OUTPATIENT
Start: 2024-03-06 | End: 2024-03-07

## 2024-03-05 RX ORDER — AMPICILLIN TRIHYDRATE 250 MG
2 CAPSULE ORAL ONCE
Refills: 0 | Status: COMPLETED | OUTPATIENT
Start: 2024-03-05 | End: 2024-03-05

## 2024-03-05 RX ORDER — SODIUM CHLORIDE 9 MG/ML
1000 INJECTION, SOLUTION INTRAVENOUS
Refills: 0 | Status: DISCONTINUED | OUTPATIENT
Start: 2024-03-05 | End: 2024-03-07

## 2024-03-05 RX ORDER — HYDROMORPHONE HYDROCHLORIDE 2 MG/ML
0.2 INJECTION INTRAMUSCULAR; INTRAVENOUS; SUBCUTANEOUS ONCE
Refills: 0 | Status: DISCONTINUED | OUTPATIENT
Start: 2024-03-05 | End: 2024-03-05

## 2024-03-05 RX ORDER — SODIUM CHLORIDE 9 MG/ML
500 INJECTION, SOLUTION INTRAVENOUS ONCE
Refills: 0 | Status: DISCONTINUED | OUTPATIENT
Start: 2024-03-05 | End: 2024-03-07

## 2024-03-05 RX ORDER — GENTAMICIN SULFATE 40 MG/ML
80 VIAL (ML) INJECTION EVERY 8 HOURS
Refills: 0 | Status: DISCONTINUED | OUTPATIENT
Start: 2024-03-05 | End: 2024-03-07

## 2024-03-05 RX ORDER — AMPICILLIN TRIHYDRATE 250 MG
CAPSULE ORAL
Refills: 0 | Status: DISCONTINUED | OUTPATIENT
Start: 2024-03-05 | End: 2024-03-07

## 2024-03-05 RX ADMIN — Medication 1 TABLET(S): at 12:27

## 2024-03-05 RX ADMIN — Medication 0.2 MILLIGRAM(S): at 12:27

## 2024-03-05 RX ADMIN — Medication 975 MILLIGRAM(S): at 16:00

## 2024-03-05 RX ADMIN — SODIUM CHLORIDE 3 MILLILITER(S): 9 INJECTION INTRAMUSCULAR; INTRAVENOUS; SUBCUTANEOUS at 22:38

## 2024-03-05 RX ADMIN — Medication 200 GRAM(S): at 22:37

## 2024-03-05 RX ADMIN — Medication 600 MILLIGRAM(S): at 18:15

## 2024-03-05 RX ADMIN — Medication 0.2 MILLIGRAM(S): at 05:37

## 2024-03-05 RX ADMIN — Medication 600 MILLIGRAM(S): at 19:00

## 2024-03-05 RX ADMIN — Medication 600 MILLIGRAM(S): at 07:00

## 2024-03-05 RX ADMIN — HYDROMORPHONE HYDROCHLORIDE 0.2 MILLIGRAM(S): 2 INJECTION INTRAMUSCULAR; INTRAVENOUS; SUBCUTANEOUS at 10:55

## 2024-03-05 RX ADMIN — Medication 104 MILLIGRAM(S): at 21:59

## 2024-03-05 RX ADMIN — SODIUM CHLORIDE 3 MILLILITER(S): 9 INJECTION INTRAMUSCULAR; INTRAVENOUS; SUBCUTANEOUS at 06:45

## 2024-03-05 RX ADMIN — Medication 600 MILLIGRAM(S): at 06:30

## 2024-03-05 RX ADMIN — Medication 975 MILLIGRAM(S): at 21:16

## 2024-03-05 RX ADMIN — Medication 975 MILLIGRAM(S): at 09:18

## 2024-03-05 RX ADMIN — Medication 600 MILLIGRAM(S): at 12:27

## 2024-03-05 RX ADMIN — Medication 975 MILLIGRAM(S): at 08:48

## 2024-03-05 RX ADMIN — Medication 975 MILLIGRAM(S): at 15:03

## 2024-03-05 NOTE — PROGRESS NOTE ADULT - ASSESSMENT
A/P: 32 y/o now P1 S/P  PPD1, recovering well.    - encourage ambulation, PO hydration  - monitor vitals, bleeding  - monitor UO s/p 500cc bolus  - vaginal packing replaced, will reevaluate  - f/u AM CBC   - routine postpartum course    Discussed with Dr. Willett.  A/P: 34 y/o now P1 S/P  PPD1, EBL 900cc, s/p IM methergine x2, on PO methergine x24hrs, recovering well.    - encourage ambulation, PO hydration  - monitor vitals, bleeding  - monitor UO s/p 500cc bolus  - vaginal packing replaced, will reevaluate  - f/u AM CBC   - routine postpartum course    Discussed with Dr. Willett.

## 2024-03-06 LAB
BASOPHILS # BLD AUTO: 0.05 K/UL — SIGNIFICANT CHANGE UP (ref 0–0.2)
BASOPHILS # BLD AUTO: 0.06 K/UL — SIGNIFICANT CHANGE UP (ref 0–0.2)
BASOPHILS NFR BLD AUTO: 0.2 % — SIGNIFICANT CHANGE UP (ref 0–1)
BASOPHILS NFR BLD AUTO: 0.2 % — SIGNIFICANT CHANGE UP (ref 0–1)
EOSINOPHIL # BLD AUTO: 0.05 K/UL — SIGNIFICANT CHANGE UP (ref 0–0.7)
EOSINOPHIL # BLD AUTO: 0.1 K/UL — SIGNIFICANT CHANGE UP (ref 0–0.7)
EOSINOPHIL NFR BLD AUTO: 0.2 % — SIGNIFICANT CHANGE UP (ref 0–8)
EOSINOPHIL NFR BLD AUTO: 0.4 % — SIGNIFICANT CHANGE UP (ref 0–8)
HCT VFR BLD CALC: 20.5 % — LOW (ref 37–47)
HCT VFR BLD CALC: 23.1 % — LOW (ref 37–47)
HGB BLD-MCNC: 6.6 G/DL — CRITICAL LOW (ref 12–16)
HGB BLD-MCNC: 7.4 G/DL — LOW (ref 12–16)
IMM GRANULOCYTES NFR BLD AUTO: 2.4 % — HIGH (ref 0.1–0.3)
IMM GRANULOCYTES NFR BLD AUTO: 2.8 % — HIGH (ref 0.1–0.3)
LYMPHOCYTES # BLD AUTO: 1.92 K/UL — SIGNIFICANT CHANGE UP (ref 1.2–3.4)
LYMPHOCYTES # BLD AUTO: 2.29 K/UL — SIGNIFICANT CHANGE UP (ref 1.2–3.4)
LYMPHOCYTES # BLD AUTO: 7.4 % — LOW (ref 20.5–51.1)
LYMPHOCYTES # BLD AUTO: 9.8 % — LOW (ref 20.5–51.1)
MCHC RBC-ENTMCNC: 19.6 PG — LOW (ref 27–31)
MCHC RBC-ENTMCNC: 19.7 PG — LOW (ref 27–31)
MCHC RBC-ENTMCNC: 32 G/DL — SIGNIFICANT CHANGE UP (ref 32–37)
MCHC RBC-ENTMCNC: 32.2 G/DL — SIGNIFICANT CHANGE UP (ref 32–37)
MCV RBC AUTO: 61 FL — LOW (ref 81–99)
MCV RBC AUTO: 61.4 FL — LOW (ref 81–99)
MONOCYTES # BLD AUTO: 1.03 K/UL — HIGH (ref 0.1–0.6)
MONOCYTES # BLD AUTO: 1.27 K/UL — HIGH (ref 0.1–0.6)
MONOCYTES NFR BLD AUTO: 4.4 % — SIGNIFICANT CHANGE UP (ref 1.7–9.3)
MONOCYTES NFR BLD AUTO: 4.9 % — SIGNIFICANT CHANGE UP (ref 1.7–9.3)
NEUTROPHILS # BLD AUTO: 19.33 K/UL — HIGH (ref 1.4–6.5)
NEUTROPHILS # BLD AUTO: 21.87 K/UL — HIGH (ref 1.4–6.5)
NEUTROPHILS NFR BLD AUTO: 82.8 % — HIGH (ref 42.2–75.2)
NEUTROPHILS NFR BLD AUTO: 84.5 % — HIGH (ref 42.2–75.2)
NRBC # BLD: 0 /100 WBCS — SIGNIFICANT CHANGE UP (ref 0–0)
NRBC # BLD: 0 /100 WBCS — SIGNIFICANT CHANGE UP (ref 0–0)
PLATELET # BLD AUTO: 203 K/UL — SIGNIFICANT CHANGE UP (ref 130–400)
PLATELET # BLD AUTO: 207 K/UL — SIGNIFICANT CHANGE UP (ref 130–400)
PMV BLD: 11.1 FL — HIGH (ref 7.4–10.4)
PMV BLD: SIGNIFICANT CHANGE UP (ref 7.4–10.4)
RBC # BLD: 3.36 M/UL — LOW (ref 4.2–5.4)
RBC # BLD: 3.76 M/UL — LOW (ref 4.2–5.4)
RBC # FLD: 16.7 % — HIGH (ref 11.5–14.5)
RBC # FLD: 16.8 % — HIGH (ref 11.5–14.5)
WBC # BLD: 23.35 K/UL — HIGH (ref 4.8–10.8)
WBC # BLD: 25.89 K/UL — HIGH (ref 4.8–10.8)
WBC # FLD AUTO: 23.35 K/UL — HIGH (ref 4.8–10.8)
WBC # FLD AUTO: 25.89 K/UL — HIGH (ref 4.8–10.8)

## 2024-03-06 RX ORDER — IRON SUCROSE 20 MG/ML
200 INJECTION, SOLUTION INTRAVENOUS ONCE
Refills: 0 | Status: COMPLETED | OUTPATIENT
Start: 2024-03-06 | End: 2024-03-06

## 2024-03-06 RX ADMIN — Medication 975 MILLIGRAM(S): at 09:34

## 2024-03-06 RX ADMIN — Medication 600 MILLIGRAM(S): at 00:56

## 2024-03-06 RX ADMIN — Medication 975 MILLIGRAM(S): at 14:53

## 2024-03-06 RX ADMIN — Medication 200 GRAM(S): at 06:02

## 2024-03-06 RX ADMIN — Medication 204 MILLIGRAM(S): at 22:50

## 2024-03-06 RX ADMIN — Medication 1 TABLET(S): at 11:55

## 2024-03-06 RX ADMIN — Medication 204 MILLIGRAM(S): at 06:12

## 2024-03-06 RX ADMIN — SODIUM CHLORIDE 3 MILLILITER(S): 9 INJECTION INTRAMUSCULAR; INTRAVENOUS; SUBCUTANEOUS at 05:54

## 2024-03-06 RX ADMIN — Medication 200 GRAM(S): at 17:37

## 2024-03-06 RX ADMIN — Medication 200 GRAM(S): at 00:56

## 2024-03-06 RX ADMIN — Medication 200 GRAM(S): at 13:50

## 2024-03-06 RX ADMIN — Medication 204 MILLIGRAM(S): at 14:52

## 2024-03-06 RX ADMIN — Medication 600 MILLIGRAM(S): at 17:36

## 2024-03-06 RX ADMIN — Medication 600 MILLIGRAM(S): at 11:55

## 2024-03-06 RX ADMIN — Medication 600 MILLIGRAM(S): at 05:30

## 2024-03-06 RX ADMIN — IRON SUCROSE 110 MILLIGRAM(S): 20 INJECTION, SOLUTION INTRAVENOUS at 11:55

## 2024-03-06 RX ADMIN — Medication 100 MILLIGRAM(S): at 20:34

## 2024-03-06 RX ADMIN — Medication 600 MILLIGRAM(S): at 12:25

## 2024-03-06 RX ADMIN — Medication 600 MILLIGRAM(S): at 06:26

## 2024-03-06 RX ADMIN — Medication 200 GRAM(S): at 23:43

## 2024-03-06 RX ADMIN — SODIUM CHLORIDE 3 MILLILITER(S): 9 INJECTION INTRAMUSCULAR; INTRAVENOUS; SUBCUTANEOUS at 21:25

## 2024-03-06 RX ADMIN — Medication 600 MILLIGRAM(S): at 01:02

## 2024-03-06 NOTE — CHART NOTE - NSCHARTNOTEFT_GEN_A_CORE
PGY2 note    At bedside to discuss drop in Hgb and recommendation for blood transfusion. She states that she feels well overall. She denies any heavy vaginal bleeding, fever, chills, severe abdominal pain, headaches, palpitations, sob, chest pain, dizziness, lightheadedness, nausea, vomiting, dysuria. We discussed the risks, benefits, alternatives, and indications for blood transfusion, patient and partners questions answered, she understands and signed the consent.    Physical Exam  Vital Signs Last 24 Hrs  T(F): 99 (06 Mar 2024 15:58), Max: 99 (06 Mar 2024 15:58)  HR: 94 (06 Mar 2024 15:58) (94 - 102)  BP: 114/59 (06 Mar 2024 15:58) (106/72 - 124/70)  RR: 18 (06 Mar 2024 15:58) (18 - 18)    Gen: NAD, AOx3  Abdomen: soft, nontender, non distended, no rebound.    -2units pRBCs ordered  -repeat post-transfusion cbc    Dr. Valdovinos and Dr. Bonner aware

## 2024-03-07 ENCOUNTER — TRANSCRIPTION ENCOUNTER (OUTPATIENT)
Age: 34
End: 2024-03-07

## 2024-03-07 VITALS
SYSTOLIC BLOOD PRESSURE: 127 MMHG | HEART RATE: 83 BPM | DIASTOLIC BLOOD PRESSURE: 83 MMHG | RESPIRATION RATE: 18 BRPM | TEMPERATURE: 100 F

## 2024-03-07 LAB
ACANTHOCYTES BLD QL SMEAR: SLIGHT — SIGNIFICANT CHANGE UP
ANISOCYTOSIS BLD QL: SLIGHT — SIGNIFICANT CHANGE UP
BASOPHILS # BLD AUTO: 0 K/UL — SIGNIFICANT CHANGE UP (ref 0–0.2)
BASOPHILS NFR BLD AUTO: 0 % — SIGNIFICANT CHANGE UP (ref 0–1)
BURR CELLS BLD QL SMEAR: PRESENT — SIGNIFICANT CHANGE UP
DACRYOCYTES BLD QL SMEAR: SLIGHT — SIGNIFICANT CHANGE UP
EOSINOPHIL # BLD AUTO: 0.28 K/UL — SIGNIFICANT CHANGE UP (ref 0–0.7)
EOSINOPHIL NFR BLD AUTO: 1.7 % — SIGNIFICANT CHANGE UP (ref 0–8)
HCT VFR BLD CALC: 30.4 % — LOW (ref 37–47)
HGB BLD-MCNC: 9.9 G/DL — LOW (ref 12–16)
HYPOCHROMIA BLD QL: SLIGHT — SIGNIFICANT CHANGE UP
LYMPHOCYTES # BLD AUTO: 1.16 K/UL — LOW (ref 1.2–3.4)
LYMPHOCYTES # BLD AUTO: 7.1 % — LOW (ref 20.5–51.1)
MANUAL SMEAR VERIFICATION: SIGNIFICANT CHANGE UP
MCHC RBC-ENTMCNC: 20.9 PG — LOW (ref 27–31)
MCHC RBC-ENTMCNC: 32.6 G/DL — SIGNIFICANT CHANGE UP (ref 32–37)
MCV RBC AUTO: 64.3 FL — LOW (ref 81–99)
MICROCYTES BLD QL: SLIGHT — SIGNIFICANT CHANGE UP
MONOCYTES # BLD AUTO: 1.16 K/UL — HIGH (ref 0.1–0.6)
MONOCYTES NFR BLD AUTO: 7.1 % — SIGNIFICANT CHANGE UP (ref 1.7–9.3)
MYELOCYTES NFR BLD: 1.8 % — HIGH (ref 0–0)
NEUTROPHILS # BLD AUTO: 13.16 K/UL — HIGH (ref 1.4–6.5)
NEUTROPHILS NFR BLD AUTO: 80.5 % — HIGH (ref 42.2–75.2)
NRBC # BLD: 4 /100 WBCS — HIGH (ref 0–0)
NRBC # BLD: SIGNIFICANT CHANGE UP /100 WBCS (ref 0–0)
PLAT MORPH BLD: ABNORMAL
PLATELET # BLD AUTO: 257 K/UL — SIGNIFICANT CHANGE UP (ref 130–400)
PMV BLD: 10.6 FL — HIGH (ref 7.4–10.4)
POIKILOCYTOSIS BLD QL AUTO: SLIGHT — SIGNIFICANT CHANGE UP
POLYCHROMASIA BLD QL SMEAR: SLIGHT — SIGNIFICANT CHANGE UP
RBC # BLD: 4.73 M/UL — SIGNIFICANT CHANGE UP (ref 4.2–5.4)
RBC # FLD: 22.1 % — HIGH (ref 11.5–14.5)
RBC BLD AUTO: ABNORMAL
SMUDGE CELLS # BLD: PRESENT — SIGNIFICANT CHANGE UP
TARGETS BLD QL SMEAR: SLIGHT — SIGNIFICANT CHANGE UP
VARIANT LYMPHS # BLD: 1.8 % — SIGNIFICANT CHANGE UP (ref 0–5)
WBC # BLD: 16.35 K/UL — HIGH (ref 4.8–10.8)
WBC # FLD AUTO: 16.35 K/UL — HIGH (ref 4.8–10.8)

## 2024-03-07 RX ORDER — IBUPROFEN 200 MG
1 TABLET ORAL
Qty: 0 | Refills: 0 | DISCHARGE
Start: 2024-03-07

## 2024-03-07 RX ORDER — ACETAMINOPHEN 500 MG
3 TABLET ORAL
Qty: 0 | Refills: 0 | DISCHARGE
Start: 2024-03-07

## 2024-03-07 RX ADMIN — Medication 975 MILLIGRAM(S): at 14:36

## 2024-03-07 RX ADMIN — Medication 1 TABLET(S): at 12:05

## 2024-03-07 RX ADMIN — Medication 975 MILLIGRAM(S): at 09:00

## 2024-03-07 RX ADMIN — SODIUM CHLORIDE 3 MILLILITER(S): 9 INJECTION INTRAMUSCULAR; INTRAVENOUS; SUBCUTANEOUS at 15:00

## 2024-03-07 RX ADMIN — Medication 975 MILLIGRAM(S): at 20:32

## 2024-03-07 RX ADMIN — Medication 975 MILLIGRAM(S): at 03:20

## 2024-03-07 RX ADMIN — Medication 100 MILLIGRAM(S): at 13:34

## 2024-03-07 RX ADMIN — Medication 600 MILLIGRAM(S): at 13:00

## 2024-03-07 RX ADMIN — Medication 200 GRAM(S): at 12:05

## 2024-03-07 RX ADMIN — Medication 975 MILLIGRAM(S): at 15:00

## 2024-03-07 RX ADMIN — Medication 600 MILLIGRAM(S): at 17:34

## 2024-03-07 RX ADMIN — Medication 600 MILLIGRAM(S): at 00:19

## 2024-03-07 RX ADMIN — Medication 100 MILLIGRAM(S): at 05:47

## 2024-03-07 RX ADMIN — Medication 600 MILLIGRAM(S): at 12:04

## 2024-03-07 RX ADMIN — Medication 975 MILLIGRAM(S): at 08:44

## 2024-03-07 RX ADMIN — Medication 600 MILLIGRAM(S): at 18:01

## 2024-03-07 RX ADMIN — Medication 204 MILLIGRAM(S): at 05:49

## 2024-03-07 RX ADMIN — Medication 200 GRAM(S): at 17:34

## 2024-03-07 RX ADMIN — Medication 200 GRAM(S): at 06:32

## 2024-03-07 RX ADMIN — Medication 975 MILLIGRAM(S): at 02:52

## 2024-03-07 RX ADMIN — SODIUM CHLORIDE 3 MILLILITER(S): 9 INJECTION INTRAMUSCULAR; INTRAVENOUS; SUBCUTANEOUS at 06:26

## 2024-03-07 RX ADMIN — Medication 204 MILLIGRAM(S): at 13:38

## 2024-03-07 NOTE — DISCHARGE NOTE OB - PATIENT PORTAL LINK FT
You can access the FollowMyHealth Patient Portal offered by [facility] by registering at the following website: [email or web address]. By joining [facility]’s FollowMyHealth portal, you will also be able to view your health information using other applications (apps) compatible with our system.

## 2024-03-07 NOTE — DISCHARGE NOTE OB - HOSPITAL COURSE
Patient is s/p . Complicated by anemia and leukocystosis. S/p antibiotics and 2u pRBC. She had an otherwise uncomplicated postpartum course and has met her postpartum milestones appropriately.  Vitals are stable for discharge.

## 2024-03-07 NOTE — DISCHARGE NOTE OB - NSPROMEDSBROUGHTTOHOSP_GEN_A_NUR
no discussed with radiology resident, Navneet, the US negative for DVT. he notes that because of a technical glitch the attending cannot post the final read but has read the imaging and the attending, Dr. Yeh, agrees there is no evidence of DVT.

## 2024-03-07 NOTE — DISCHARGE NOTE OB - MATERIALS PROVIDED
Guide to Postpartum Care/Breastfeeding Guide and Packet [facility] [place] Screening Program/Guide to Postpartum Care/Back To Sleep Handout/Shaken Baby Prevention Handout/Breastfeeding Guide and Packet

## 2024-03-07 NOTE — DISCHARGE NOTE OB - EAT A WELL BALANCED DIET INCLUDING PROTEINS (LEAN MEATS, POULTRY, FISH AND BEANS), FRESH FRUIT OR JUICE, FRESH VEGETABLES, AND DAIRY PRODUCTS
Statement Selected
I will START or STAY ON the medications listed below when I get home from the hospital:    acetaminophen 325 mg oral tablet  -- 2 tab(s) by mouth every 6 hours  -- Indication: For pain    acetaminophen 650 mg rectal suppository  -- 1 suppository(ies) rectally every 6 hours, As needed, For Temp greater than 38.5 C (101.3 F)  -- Indication: For not needed    oxyCODONE-acetaminophen 5 mg-325 mg oral tablet  -- 1 tab(s) by mouth every 4 hours, As needed, Moderate Pain (4 - 6)  -- Indication: For pain    gabapentin 100 mg oral capsule  -- 2 cap(s) by mouth 3 times a day  -- Indication: For pain    gabapentin 100 mg oral capsule  -- 2 cap(s) by mouth 3 times a day  -- Indication: For pain    Levaquin 500 mg oral tablet  -- 1 tab(s) by mouth once a day   -- Avoid prolonged or excessive exposure to direct and/or artificial sunlight while taking this medication.  Do not take dairy products, antacids, or iron preparations within one hour of this medication.  Finish all this medication unless otherwise directed by prescriber.  May cause drowsiness or dizziness.  Medication should be taken with plenty of water.    -- Indication: For pneumonia

## 2024-03-07 NOTE — DISCHARGE NOTE OB - CARE PROVIDER_API CALL
Rodolfo Valdovinos  Obstetrics and Gynecology  [address] [place] [address]  Phone: [phone]  Fax: [phone]  Established Patient  Follow Up Time:

## 2024-03-08 RX ORDER — ACETAMINOPHEN 500 MG
3 TABLET ORAL
Qty: 168 | Refills: 0
Start: 2024-03-08 | End: 2024-03-21

## 2024-03-08 RX ORDER — IBUPROFEN 200 MG
1 TABLET ORAL
Qty: 56 | Refills: 0
Start: 2024-03-08 | End: 2024-03-21

## 2024-03-12 DIAGNOSIS — Z28.21 IMMUNIZATION NOT CARRIED OUT BECAUSE OF PATIENT REFUSAL: ICD-10-CM

## 2024-03-12 DIAGNOSIS — D50.9 IRON DEFICIENCY ANEMIA, UNSPECIFIED: ICD-10-CM

## 2024-03-12 DIAGNOSIS — D72.829 ELEVATED WHITE BLOOD CELL COUNT, UNSPECIFIED: ICD-10-CM

## 2024-03-12 DIAGNOSIS — Z28.09 IMMUNIZATION NOT CARRIED OUT BECAUSE OF OTHER CONTRAINDICATION: ICD-10-CM

## 2024-03-12 DIAGNOSIS — D56.3 THALASSEMIA MINOR: ICD-10-CM

## 2024-03-12 DIAGNOSIS — K59.00 CONSTIPATION, UNSPECIFIED: ICD-10-CM

## 2024-03-12 DIAGNOSIS — Z3A.40 40 WEEKS GESTATION OF PREGNANCY: ICD-10-CM

## 2024-05-16 NOTE — OB PROVIDER DELIVERY SUMMARY - NSLACERATION_OBGYN_ALL_OB
Provider: ALEX    Caller: PATIENT    Phone Number: [phone]    Reason for Call: PATIENT STATES SHE HASN'T HEARD FROM [facility] ABOUT SCHEDULING HER MRI. SHE IS ASKING FOR SOME ASSISTANCE IN GETTING THIS SCHEDULED PLEASE.     Yes

## 2024-06-12 NOTE — OB RN PATIENT PROFILE - AS SC BRADEN ACTIVITY
HR=62 bpm, DMKE=394/94 mmhg, SpO2=96.0 %, Resp=19 B/min, EtCO2=27 mmHg, Apnea=3 Seconds, Michael=2 (4) walks frequently

## 2024-09-16 NOTE — OB PROVIDER LABOR PROGRESS NOTE - NS_STATION_OBGYN_ALL_OB_NU
-1 Initiate Treatment: Ketoconazole shampoo 3x weekly, Ok to combine with Head and shoulders shampoo Detail Level: Simple Render In Strict Bullet Format?: No